# Patient Record
Sex: FEMALE | Race: WHITE | Employment: PART TIME | URBAN - NONMETROPOLITAN AREA
[De-identification: names, ages, dates, MRNs, and addresses within clinical notes are randomized per-mention and may not be internally consistent; named-entity substitution may affect disease eponyms.]

---

## 2023-10-26 ENCOUNTER — HOSPITAL ENCOUNTER (OUTPATIENT)
Age: 23
Discharge: HOME | End: 2023-10-26
Payer: COMMERCIAL

## 2023-10-26 PROCEDURE — 36415 COLL VENOUS BLD VENIPUNCTURE: CPT

## 2023-10-26 PROCEDURE — 86694 HERPES SIMPLEX NES ANTBDY: CPT

## 2023-10-26 PROCEDURE — 86695 HERPES SIMPLEX TYPE 1 TEST: CPT

## 2023-10-26 PROCEDURE — 86696 HERPES SIMPLEX TYPE 2 TEST: CPT

## 2023-10-31 LAB
HSV1 IGG SERPL QL IA: 0.75
HSV1+2 IGM SER QL IA: 1.07
HSV2 AB SER QL IA: 0.05

## 2024-01-10 ENCOUNTER — OFFICE VISIT (OUTPATIENT)
Dept: OBGYN | Age: 24
End: 2024-01-10
Payer: COMMERCIAL

## 2024-01-10 VITALS
WEIGHT: 157 LBS | SYSTOLIC BLOOD PRESSURE: 112 MMHG | BODY MASS INDEX: 23.79 KG/M2 | DIASTOLIC BLOOD PRESSURE: 68 MMHG | HEIGHT: 68 IN

## 2024-01-10 DIAGNOSIS — N63.15 BREAST LUMP ON RIGHT SIDE AT 3 O'CLOCK POSITION: Primary | ICD-10-CM

## 2024-01-10 PROCEDURE — 99202 OFFICE O/P NEW SF 15 MIN: CPT | Performed by: ADVANCED PRACTICE MIDWIFE

## 2024-01-10 RX ORDER — FLUOXETINE HYDROCHLORIDE 40 MG/1
40 CAPSULE ORAL DAILY
COMMUNITY
Start: 2022-12-19

## 2024-01-10 RX ORDER — MULTIVIT-MIN/IRON/FOLIC ACID/K 18-600-40
CAPSULE ORAL
COMMUNITY

## 2024-01-10 RX ORDER — M-VIT,TX,IRON,MINS/CALC/FOLIC 27MG-0.4MG
1 TABLET ORAL DAILY
COMMUNITY

## 2024-01-10 NOTE — PROGRESS NOTES
PROBLEM VISIT     Date of service: 1/10/2024    Orquidea De Souza  Is a 23 y.o. single, female    PT's PCP is: No primary care provider on file.     : 2000                                             Subjective:       Patient's last menstrual period was 2023 (approximate).   OB History    Para Term  AB Living   0 0 0 0 0 0   SAB IAB Ectopic Molar Multiple Live Births   0 0 0 0 0 0        Social History     Tobacco Use   Smoking Status Never   Smokeless Tobacco Never        Social History     Substance and Sexual Activity   Alcohol Use Yes    Comment: rarely       Allergies: Patient has no known allergies.      Current Outpatient Medications:     FLUoxetine (PROZAC) 40 MG capsule, Take 1 capsule by mouth daily, Disp: , Rfl:     PROBIOTIC PRODUCT PO, Take by mouth, Disp: , Rfl:     Multiple Vitamins-Minerals (THERAPEUTIC MULTIVITAMIN-MINERALS) tablet, Take 1 tablet by mouth daily, Disp: , Rfl:     Cholecalciferol (VITAMIN D) 50 MCG (2000 UT) CAPS capsule, Take by mouth, Disp: , Rfl:     Social History     Substance and Sexual Activity   Sexual Activity Not Currently       Last Yearly date:  never    Last pap date and results: never    Last HPV date and results: never    Have you had a positive covid test: Yes    Have you had the covid immunization: Yes    Chief Complaint   Patient presents with    Breast Mass     C/O right breast lump for approx. 1 week. Both breast feel tender. No nipple discharge no injury.         PE:  Vital Signs  Blood pressure 112/68, height 1.727 m (5' 8\"), weight 71.2 kg (157 lb), last menstrual period 2023, not currently breastfeeding.  Estimated body mass index is 23.87 kg/m² as calculated from the following:    Height as of this encounter: 1.727 m (5' 8\").    Weight as of this encounter: 71.2 kg (157 lb).    No data recorded    NURSE: Bernardino IRAHETA    HPI: patient here for tender lump right breast; reports hx of irregular menses but should be right

## 2024-01-22 ENCOUNTER — HOSPITAL ENCOUNTER (OUTPATIENT)
Dept: ULTRASOUND IMAGING | Age: 24
Discharge: HOME | End: 2024-01-24
Attending: ADVANCED PRACTICE MIDWIFE
Payer: COMMERCIAL

## 2024-01-22 DIAGNOSIS — N63.15 BREAST LUMP ON RIGHT SIDE AT 3 O'CLOCK POSITION: ICD-10-CM

## 2024-01-22 PROCEDURE — 76642 ULTRASOUND BREAST LIMITED: CPT
